# Patient Record
Sex: FEMALE | Race: WHITE | NOT HISPANIC OR LATINO | Employment: OTHER | URBAN - METROPOLITAN AREA
[De-identification: names, ages, dates, MRNs, and addresses within clinical notes are randomized per-mention and may not be internally consistent; named-entity substitution may affect disease eponyms.]

---

## 2022-05-06 ENCOUNTER — HOSPITAL ENCOUNTER (EMERGENCY)
Facility: HOSPITAL | Age: 72
Discharge: HOME | End: 2022-05-06
Attending: EMERGENCY MEDICINE | Admitting: EMERGENCY MEDICINE
Payer: COMMERCIAL

## 2022-05-06 ENCOUNTER — APPOINTMENT (EMERGENCY)
Dept: RADIOLOGY | Facility: HOSPITAL | Age: 72
End: 2022-05-06
Payer: COMMERCIAL

## 2022-05-06 VITALS
BODY MASS INDEX: 38.92 KG/M2 | SYSTOLIC BLOOD PRESSURE: 139 MMHG | DIASTOLIC BLOOD PRESSURE: 67 MMHG | HEIGHT: 67 IN | HEART RATE: 102 BPM | OXYGEN SATURATION: 98 % | WEIGHT: 248 LBS | RESPIRATION RATE: 18 BRPM | TEMPERATURE: 98 F

## 2022-05-06 DIAGNOSIS — S80.00XA CONTUSION OF KNEE AND LOWER LEG: ICD-10-CM
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
## 2022-05-06 DIAGNOSIS — S80.10XA CONTUSION OF KNEE AND LOWER LEG: ICD-10-CM

## 2022-05-06 PROCEDURE — 90471 IMMUNIZATION ADMIN: CPT

## 2022-05-06 PROCEDURE — G1004 CDSM NDSC: HCPCS

## 2022-05-06 PROCEDURE — 70450 CT HEAD/BRAIN W/O DYE: CPT

## 2022-05-06 PROCEDURE — 90715 TDAP VACCINE 7 YRS/> IM: CPT | Performed by: EMERGENCY MEDICINE

## 2022-05-06 PROCEDURE — 72125 CT NECK SPINE W/O DYE: CPT

## 2022-05-06 PROCEDURE — 73590 X-RAY EXAM OF LOWER LEG: CPT

## 2022-05-06 PROCEDURE — 99284 EMERGENCY DEPT VISIT MOD MDM: CPT | Performed by: EMERGENCY MEDICINE

## 2022-05-06 PROCEDURE — 99284 EMERGENCY DEPT VISIT MOD MDM: CPT

## 2022-05-06 RX ADMIN — TETANUS TOXOID, REDUCED DIPHTHERIA TOXOID AND ACELLULAR PERTUSSIS VACCINE, ADSORBED 0.5 ML: 5; 2.5; 8; 8; 2.5 SUSPENSION INTRAMUSCULAR at 04:04

## 2022-05-06 NOTE — ED PROVIDER NOTES
Final Diagnosis:  1  Fall, initial encounter    2  Contusion of knee and lower leg        Chief Complaint   Patient presents with    Fall     Pt reports getting out of bed and falling on right knee, did not hit head, no LOC, on blood thinners (rivaroxaban)     HPI  66-year-old she was at home she fell onto her right knee did not strike her head though she did grab her neck listen it left an abrasion on her neck  She fell onto her right knee and there she has some swelling over her proximal fibula  She is on rivaroxaban  She had no loss consciousness does have a headache now no vomiting after that  Full range of motion in the knee  Ambulates  Can't recall tdap     - No language barrier    - History obtained from patient  - There are no limitations to the history obtained  - Previous charting underwent limited review with attention to last ED visits, labs, ekgs, and prior imaging  PMH:   has a past medical history of Arthritis, Diabetes mellitus ([address] ), and Psychiatric disorder  PSH:   has a past surgical history that includes Breast surgery  Social History:    Tobacco Use: Low Risk     Smoking Tobacco Use: Never Smoker    Smokeless Tobacco Use: Never Used     Alcohol Use: Not on file    brought by friend  Patient with no illicit use    ROS:    Pertinent positives/negatives:   Review of Systems     CONSTITUTIONAL:  No dizziness  No weakness  No unexpected weight loss  EYES:  No pain, erythema, or discharge  No loss of vision  ENT:  No tinnitus, decreased hearing  No epistaxis/purulent drainage  No voice change, airway closing, trismus  CARDIOVASCULAR:  No chest pain  No palpitations  No new lower extremity edema  RESPIRATORY:  No purulent cough  No hemoptysis  No dyspnea  No paroxysmal nocturnal dyspnea  No stridor, audible wheezing bedside  GASTROINTESTINAL:  Normal appetite  No vomiting, diarrhea  No pain  No bloating  No melena  GENITOURINARY:  No frequency, urgency, nocturia   No hematuria or dysuria  No discharge  No sores/adenopathy  MUSCULOSKELETAL:  No arthralgias or myalgias that are new  INTEGUMENTARY:  No swelling  No unexpected contusions  No abrasions  No lymphangitis  NEUROLOGIC:  No meningismus  No numbness of the extremities  No new focal weakness  No postural instability  PSYCHIATRIC:  No SI HI AVH  HEMATOLOGICAL:  No bleeding  No petechiae  No bruising  ALLERGIES:  No urticaria  No sudden abd cramping  No stridor  PE:     Physical exam highlights:   Physical Exam  Skin:     Findings: Lesion present  on neck    Contusion on knee      Vitals:    05/06/22 0331 05/06/22 0337   BP: 139/67    BP Location: Right arm    Pulse: 102    Resp: 18    Temp: 98 °F (36 7 °C)    TempSrc: Oral    SpO2: 98%    Weight:  112 kg (248 lb)   Height:  5' 7" (1 702 m)     Vitals reviewed by me  Nursing note reviewed  Chaperone present for all sensitive exam   Const: No acute distress  Alert  Nontoxic  Not diaphoretic  HEENT: External ears normal  No protrusion drainage swelling  Nose normal  No drainage/traumatic deformity  MMM  Mouth with baseline/symmetric movement  No trismus  Eyes: No squinting  No icterus  Tracks through the room with normal EOM  No tearing/swelling/drainage  Neck: ROM normal  No rigidity  No meningismus  Cards: Rate as per vitals  Compared to monitor sinus unless documented above  Regular  Well perfused  Pulm: able to verbalize without additional effort  Effort and excursion normal  No disress  No audible wheezing/ stridor  Normal resp rate  Abd: No distension beyond baseline  No fluctuant wave  Patient without peritoneal pain with shifting/bumping the bed  MSK: ROM normal and baseline  No deformity  Skin: No new rashes visible  Well perfused  Neuro: Nonfocal  Baseline  CN grossly intact  Moving all four with coordination  Psych: Normal behavior and affect  A:  - Nursing note reviewed      Ddx and MDM  Ct head  ro ICH  Ct c spine  ro fx    xr tib fib                            CT head without contrast   Final Result   Mild cerebral atrophy with chronic small vessel ischemic white matter disease  No acute intracranial abnormality  Workstation performed: [id number]         CT spine cervical without contrast   Final Result   Advanced degenerative changes of the cervical spine  No acute cervical spine fracture or traumatic malalignment  Workstation performed: [id number]         XR tibia fibula 2 views RIGHT    (Results Pending)     Orders Placed This Encounter   Procedures    CT head without contrast    CT spine cervical without contrast    XR tibia fibula 2 views RIGHT    Nursing Communication Ace wrap leg to stop swelling, use ice over ace wrap (real ice)     Labs Reviewed - No data to display    Final Diagnosis:  1  Fall, initial encounter    2  Contusion of knee and lower leg        P:  - hospital tx includes   Medications   tetanus-diphtheria-acellular pertussis (BOOSTRIX) IM injection 0 5 mL (0 5 mL Intramuscular Given 5/6/22 0404)         - disposition  Time reflects when diagnosis was documented in both MDM as applicable and the Disposition within this note     Time User Action Codes Description Comment    5/6/2022  5:20 AM Juliana Plascencia Add [G42  XXXA] Fall, initial encounter     5/6/2022  5:20 AM Juliana Plascencia Add [S80 00XA,  S80 10XA] Contusion of knee and lower leg       ED Disposition     ED Disposition Condition Date/Time Comment    Discharge Stable Fri May 6, 2022  5:20 AM Lyly Drake discharge to home/self care              Follow-up Information     Follow up With Specialties Details Why Contact Info Additional Information    [address]  [facility]  unless you have a pcp, then see your pcp [facility]  [facility] [address] [facility] [id number]  [address] [place] [address] [facility] [address] [place] [address] - patient will call their PCP to let them know they were in the emergency department  We discuss return precautions       - additional tx intended, if consistent with primary provider:  - patient to follow with : There are no discharge medications for this patient  No discharge procedures on file  None       Portions of the record may have been created with voice recognition software  Occasional wrong word or "sound a like" substitutions may have occurred due to the inherent limitations of voice recognition software  Read the chart carefully and recognize, using context, where substitutions have occurred      Electronically signed by:  MD Devonte Pena MD  05/06/22 5790

## 2022-05-06 NOTE — DISCHARGE INSTRUCTIONS
Keep your knee wrapped as a way to control swelling  Reuse the ice bag, it's a good one      Try to get some compression stockings